# Patient Record
Sex: FEMALE | Race: WHITE
[De-identification: names, ages, dates, MRNs, and addresses within clinical notes are randomized per-mention and may not be internally consistent; named-entity substitution may affect disease eponyms.]

---

## 2019-01-31 ENCOUNTER — HOSPITAL ENCOUNTER (INPATIENT)
Dept: HOSPITAL 74 - YASAS | Age: 39
LOS: 3 days | Discharge: HOME | DRG: 773 | End: 2019-02-03
Attending: NEUROMUSCULOSKELETAL MEDICINE & OMM | Admitting: NEUROMUSCULOSKELETAL MEDICINE & OMM
Payer: COMMERCIAL

## 2019-01-31 VITALS — BODY MASS INDEX: 24.3 KG/M2

## 2019-01-31 DIAGNOSIS — F90.9: ICD-10-CM

## 2019-01-31 DIAGNOSIS — I10: ICD-10-CM

## 2019-01-31 DIAGNOSIS — F10.230: Primary | ICD-10-CM

## 2019-01-31 DIAGNOSIS — F11.20: ICD-10-CM

## 2019-01-31 DIAGNOSIS — G47.00: ICD-10-CM

## 2019-01-31 DIAGNOSIS — F10.220: ICD-10-CM

## 2019-01-31 DIAGNOSIS — K74.60: ICD-10-CM

## 2019-01-31 DIAGNOSIS — Z91.5: ICD-10-CM

## 2019-01-31 DIAGNOSIS — Z91.19: ICD-10-CM

## 2019-01-31 DIAGNOSIS — F17.213: ICD-10-CM

## 2019-01-31 DIAGNOSIS — B18.2: ICD-10-CM

## 2019-01-31 DIAGNOSIS — J45.22: ICD-10-CM

## 2019-01-31 DIAGNOSIS — Z59.0: ICD-10-CM

## 2019-01-31 DIAGNOSIS — Z87.42: ICD-10-CM

## 2019-01-31 DIAGNOSIS — F51.05: ICD-10-CM

## 2019-01-31 DIAGNOSIS — F43.10: ICD-10-CM

## 2019-01-31 DIAGNOSIS — Z88.2: ICD-10-CM

## 2019-01-31 DIAGNOSIS — F19.24: ICD-10-CM

## 2019-01-31 DIAGNOSIS — Z91.013: ICD-10-CM

## 2019-01-31 PROCEDURE — HZ2ZZZZ DETOXIFICATION SERVICES FOR SUBSTANCE ABUSE TREATMENT: ICD-10-PCS | Performed by: SURGERY

## 2019-01-31 RX ADMIN — NICOTINE SCH: 21 PATCH TRANSDERMAL at 14:02

## 2019-01-31 RX ADMIN — Medication SCH MG: at 22:13

## 2019-01-31 SDOH — ECONOMIC STABILITY - HOUSING INSECURITY: HOMELESSNESS: Z59.0

## 2019-01-31 NOTE — EKG
Test Reason : 

Blood Pressure : ***/*** mmHG

Vent. Rate : 070 BPM     Atrial Rate : 070 BPM

   P-R Int : 144 ms          QRS Dur : 090 ms

    QT Int : 436 ms       P-R-T Axes : 010 055 059 degrees

   QTc Int : 470 ms

 

NORMAL SINUS RHYTHM

NORMAL ECG

NO PREVIOUS ECGS AVAILABLE

Confirmed by SKY DE LUNA, YVETTE ([id number]) on 1/31/2019 4:36:46 PM

 

Referred By:             Confirmed By:YVETTE SWANSON MD

## 2019-01-31 NOTE — HP
CIWA Score


Nausea/Vomiting: 3


Muscle Tremors: 3


Anxiety: 3


Agitation: 3


Paroxysmal Sweats: 1-Minimal Palms Moist


Orientation: 0-Oriented


Tacttile Disturbances: 1-Very Mild Itch/Numbness


Auditory Disturbances: 1-Very Mild


Visual Disturbances: 1-Very Mild Sensitivity


Headache: 2-Mild


CIWA-Ar Total Score: 18





- Admission Criteria


[facility] Guidelines: Admission for Medically Managed Detox: 


Requires at least one of the followin. CIWA greater than 12


2. Seizures within the past 24 hours


3. Delirium tremens within the past 24 hours


4. Hallucinations within the past 24 hours


5. Acute intervention needed for co  occurring medical disorder


6. Acute intervention needed for co  occurring psychiatric disorder


7. Severe withdrawal that cannot be handled at a lower level of care (continued


    vomiting, continued diarrhea, abnormal vital signs) requiring intravenous


    medication and/or fluids


8. Pregnancy





Patient presents the following: CIWA greater than 12


Admission Criteria Met: Admission criteria met





Admission ROS S





- HPI


Chief Complaint: 





i need help to stop drinking alcohol


Allergies/Adverse Reactions: 


 Allergies











Allergy/AdvReac Type Severity Reaction Status Date / Time


 


Fish Containing Products Allergy Severe Difficulty Verified 19 11:06





   Breathing  


 


Sulfa (Sulfonamide Allergy Severe Swelling Verified 19 11:06





Antibiotics)     











History of Present Illness: 





this 38 years old female with alcohol dependence,seeking detox,last treatment 

interfaithe 19 to 19 not completed,


mmtp 80 mgs/day,last medicated 19


history of hypertension


hepatitis c since  treated but no treatment since 19 non compliance


asthma on albuterol inhaler


cirrhosis of liver since 2016


longest period of sobriety 1 year


nicotine dependence











- Ebola screening


Have you traveled outside of the country in the last 21 days: No


Have you had contact with anyone from an Ebola affected area: No


Have you been sick,other than usual withdrawal symptoms: No


Do you have a fever: No





- Review of Systems


Constitutional: Loss of Appetite, Malaise, Night Sweats, Changes in sleep, 

Weakness


EENT: reports: Nose Congestion


Respiratory: reports: Other (asthma)


Cardiac: reports: No Symptoms Reported


GI: reports: Nausea, Poor Appetite, Vomiting, Abdominal cramping


: reports: No Symptoms Reported


Musculoskeletal: reports: Back Pain, Muscle Pain


Integumentary: reports: Dryness


Neuro: reports: Headache, Tremors


Endocrine: reports: No Symptoms Reported


Hematology: reports: No Symptoms Reported


Psychiatric: reports: No Sypmtoms Reported, Judgement Intact, Mood/Affect 

Appropiate, Orientated x3, Anxious (adhd,ptsd), Depressed





Patient History





- Patient Medical History


Hx Asthma: Yes (Pt is on MDI.)


Hx Chronic Obstructive Pulmonary Disease (COPD): No


Hx Cardiac Disorders: No


Hx Hypertension: Yes (on meds.)


Hx Seizures: No


Hx Diabetes: No


Hx Gastrointestinal Disorders: No


Hx Genitourinary Disorders: No


Hx Sexually Transmitted Disorders: Yes (Tx for chlamydia, HPV)


Hx Renal Disease (ESRD): No


Hx Thyroid Disease: No


Hx Human Immunodeficiency Virus (HIV): No (last  negative)


Hx Hepatitis C: Yes (jose eduardo compliance wtih treatment)


Hx Depression: Yes


Hx Suicide Attempt: Yes (Tried to cut herself in 2016)


Hx Bipolar Disorder: No


Hx Schizophrenia: No


Other Medical History: anxiety,adhd,ptsd





- Patient Surgical History


Past Surgical History: Yes


Other Surgical History: Tonsilectomy in 





- PPD History


Previous Implant?: Yes


Documented Results: Negative w/o proof


Implanted On Prior R Admission?: No


PPD to be Administered?: Yes





- Reproductive History


Patient is a Female of Child Bearing Age (11 -55 yrs old): Yes


Last Menstrual Period: 19


Patient Pregnant: No





- Smoking Cessation


Smoking history: Current every day smoker


Have you smoked in the past 12 months: Yes


Aproximately how many cigarettes per day: 10


Hx Chewing Tobacco Use: No


Initiated information on smoking cessation: Yes


'Breaking Loose' booklet given: 19





- Substance & Tx. History


Hx Alcohol Use: Yes


Hx Substance Use: No


Substance Use Type: Alcohol


Hx Substance Use Treatment: Yes (interfaithe 19 to 19 not completed)





- Substances Abused


  ** Alcohol


Route: Oral


Frequency: Daily


Amount used: 1 liter vodka


Age of first use: 15


Date of Last Use: 19





Family Disease History





- Family Disease History


Family Disease History: Other: Father (alcohol,), Mother (alcohol)





Admission Physical Exam [facility]





- Vital Signs


Vital Signs: 


 Vital Signs - 24 hr











  19





  10:03


 


Temperature 98.1 F


 


Pulse Rate 90


 


Respiratory 18





Rate 


 


Blood Pressure 127/98














- Physical


General Appearance: Yes: Moderate Distress, Tremorous, Irritable, Sweating, 

Anxious


HEENTM: Yes: Normal ENT Inspection, СВЕТЛАНА, Pharynx Normal, Other (no teeth,no 

denture)


Respiratory: Yes: Lungs Clear, Normal Breath Sounds, No Respiratory Distress, 

Other (asthma)


Neck: Yes: Within Normal Limits, Supple, Trachea in good position


Breast: Yes: Breast Exam Deferred


Cardiology: Yes: Within Normal Limits, Regular Rhythm, Regular Rate, S1, S2


Abdominal: Yes: Within Normal Limits, Normal Bowel Sounds, Non Tender, Flat, 

Soft


Genitourinary: Yes: Within Normal Limits


Back: Yes: Muscle Spasm


Musculoskeletal: Yes: Back pain, Muscle Pain


Extremities: Yes: Tremors


Neurological: Yes: CNs II-XII NML intact, Fully Oriented, Alert, Motor Strength 

5/5


Integumentary: Yes: Dry


Lymphatic: Yes: Within Normal Limits





- Diagnostic


(1) Alcohol dependence with uncomplicated withdrawal


Current Visit: Yes   Status: Acute   





(2) Alcohol dependence with uncomplicated intoxication


Current Visit: Yes   Status: Acute   





(3) Syncope


Current Visit: Yes   Status: Acute   





(4) Hepatitis C


Current Visit: Yes   Status: Acute   





(5) History of cirrhosis of liver


Current Visit: Yes   Status: Acute   





(6) Hypertension


Current Visit: Yes   Status: Acute   





(7) Nicotine dependence


Current Visit: Yes   Status: Acute   





(8) Asthma


Current Visit: Yes   Status: Acute   





(9) Methadone maintenance therapy patient


Current Visit: Yes   Status: Acute   





(10) History of suicidal ideation


Current Visit: Yes   Status: Acute   





(11) Insomnia secondary to depression with anxiety


Current Visit: Yes   Status: Acute   





(12) ADHD


Current Visit: Yes   Status: Acute   





(13) PTSD (post-traumatic stress disorder)


Current Visit: Yes   Status: Acute   





Cleared for Admission [facility]





- Detox or Rehab


[facility] Level of Care: Medically Managed (patient has previous detox with librium,

would like to be on librium regimen,liver enzyme pending,will give liriium 

regimen for now)


Detox Regimen/Protocol: Librium





[facility] Breath Alcohol Content


Breath Alcohol Content: 0.135





Urine Pregancy Test





- Result


Urine Pregnancy Test Results: Negative- NO Line Present





Urine Drug Screen





- Results


Drug Screen Negative: No


Urine Drug Screen Results: BZO-Benzodiazepines, MTD-Methadone

## 2019-02-01 LAB
ALBUMIN SERPL-MCNC: 3.5 G/DL (ref 3.4–5)
ALP SERPL-CCNC: 68 U/L (ref 45–117)
ALT SERPL-CCNC: 26 U/L (ref 13–61)
ANION GAP SERPL CALC-SCNC: 9 MMOL/L (ref 8–16)
APPEARANCE UR: CLEAR
AST SERPL-CCNC: 30 U/L (ref 15–37)
BACTERIA #/AREA URNS HPF: (no result) /HPF
BILIRUB SERPL-MCNC: 1 MG/DL (ref 0.2–1)
BILIRUB UR STRIP.AUTO-MCNC: NEGATIVE MG/DL
BUN SERPL-MCNC: 6 MG/DL (ref 7–18)
CALCIUM SERPL-MCNC: 8.8 MG/DL (ref 8.5–10.1)
CHLORIDE SERPL-SCNC: 104 MMOL/L (ref 98–107)
CO2 SERPL-SCNC: 28 MMOL/L (ref 21–32)
COLOR UR: YELLOW
CREAT SERPL-MCNC: 0.6 MG/DL (ref 0.55–1.3)
DEPRECATED RDW RBC AUTO: 14.6 % (ref 11.6–15.6)
EPITH CASTS URNS QL MICRO: (no result) /HPF
GLUCOSE SERPL-MCNC: 98 MG/DL (ref 74–106)
HCT VFR BLD CALC: 37 % (ref 32.4–45.2)
HGB BLD-MCNC: 12.7 GM/DL (ref 10.7–15.3)
KETONES UR QL STRIP: NEGATIVE
LEUKOCYTE ESTERASE UR QL STRIP.AUTO: (no result)
MCH RBC QN AUTO: 31.2 PG (ref 25.7–33.7)
MCHC RBC AUTO-ENTMCNC: 34.3 G/DL (ref 32–36)
MCV RBC: 90.8 FL (ref 80–96)
NITRITE UR QL STRIP: NEGATIVE
PH UR: 6 [PH] (ref 5–8)
PLATELET # BLD AUTO: 152 K/MM3 (ref 134–434)
PMV BLD: 9.8 FL (ref 7.5–11.1)
POTASSIUM SERPLBLD-SCNC: 4.1 MMOL/L (ref 3.5–5.1)
PROT SERPL-MCNC: 6.5 G/DL (ref 6.4–8.2)
PROT UR QL STRIP: NEGATIVE
PROT UR QL STRIP: NEGATIVE
RBC # BLD AUTO: 4.07 M/MM3 (ref 3.6–5.2)
SODIUM SERPL-SCNC: 140 MMOL/L (ref 136–145)
SP GR UR: 1 (ref 1.01–1.03)
UROBILINOGEN UR STRIP-MCNC: NEGATIVE MG/DL (ref 0.2–1)
WBC # BLD AUTO: 3.8 K/MM3 (ref 4–10)

## 2019-02-01 RX ADMIN — TOLNAFTATE SCH APPLIC: 10 CREAM TOPICAL at 21:47

## 2019-02-01 RX ADMIN — Medication SCH MG: at 21:48

## 2019-02-01 RX ADMIN — IBUPROFEN PRN MG: 400 TABLET, FILM COATED ORAL at 16:41

## 2019-02-01 RX ADMIN — Medication SCH TAB: at 10:13

## 2019-02-01 RX ADMIN — FLUTICASONE PROPIONATE SCH SPRAY: 50 SPRAY, METERED NASAL at 21:45

## 2019-02-01 RX ADMIN — HYDROCORTISONE SCH APPLIC: 0.5 CREAM TOPICAL at 15:38

## 2019-02-01 RX ADMIN — TOLNAFTATE SCH APPLIC: 10 CREAM TOPICAL at 14:29

## 2019-02-01 RX ADMIN — HYDROCORTISONE SCH APPLIC: 0.5 CREAM TOPICAL at 21:46

## 2019-02-01 RX ADMIN — IBUPROFEN PRN MG: 400 TABLET, FILM COATED ORAL at 10:15

## 2019-02-01 RX ADMIN — METHADONE HYDROCHLORIDE SCH MG: 40 TABLET ORAL at 05:41

## 2019-02-01 RX ADMIN — NICOTINE SCH MG: 21 PATCH TRANSDERMAL at 10:17

## 2019-02-01 RX ADMIN — NIFEDIPINE SCH MG: 30 TABLET, EXTENDED RELEASE ORAL at 10:15

## 2019-02-01 RX ADMIN — FLUTICASONE PROPIONATE SCH SPRAY: 50 SPRAY, METERED NASAL at 15:38

## 2019-02-01 NOTE — PN
S CIWA





- CIWA Score


Nausea/Vomiting: 3


Muscle Tremors: 3


Anxiety: 3


Agitation: 2


Paroxysmal Sweats: 3


Orientation: 0-Oriented


Tacttile Disturbances: 2-Mild Itch/Numbness/Burn


Auditory Disturbances: 0-None


Visual Disturbances: 0-None


Headache: 3-Moderate


CIWA-Ar Total Score: 19





BHS Progress Note (SOAP)


Subjective: 





Tremors, Sweating, H/A, Nausea, Stomach Cramping, Chills, Interrupted Sleep, 

Constipation, Body Aches.


Objective: 


PATIENT A & O X 3, OBSERVED AMBULATING ON UNIT. IN NO ACUTE DISTRESS.





02/01/19 18:39


 Vital Signs











Temperature  96.8 F L  02/01/19 17:22


 


Pulse Rate  75   02/01/19 17:22


 


Respiratory Rate  18   02/01/19 17:22


 


Blood Pressure  122/85   02/01/19 17:22


 


O2 Sat by Pulse Oximetry (%)      








 Laboratory Tests











  02/01/19 02/01/19 02/01/19





  00:30 07:00 07:00


 


WBC    3.8 L


 


RBC    4.07


 


Hgb    12.7


 


Hct    37.0


 


MCV    90.8


 


MCH    31.2


 


MCHC    34.3


 


RDW    14.6


 


Plt Count    152


 


MPV    9.8


 


Sodium   


 


Potassium   


 


Chloride   


 


Carbon Dioxide   


 


Anion Gap   


 


BUN   


 


Creatinine   


 


Creat Clearance w eGFR   


 


Random Glucose   


 


Calcium   


 


Total Bilirubin   


 


AST   


 


ALT   


 


Alkaline Phosphatase   


 


Total Protein   


 


Albumin   


 


Urine Color  Yellow  


 


Urine Appearance  Clear  


 


Urine pH  6.0  


 


Ur Specific Gravity  1.004 L  


 


Urine Protein  Negative  


 


Urine Glucose (UA)  Negative  


 


Urine Ketones  Negative  


 


Urine Blood  1+ H  


 


Urine Nitrite  Negative  


 


Urine Bilirubin  Negative  


 


Urine Urobilinogen  Negative  


 


Ur Leukocyte Esterase  Trace  


 


Urine WBC (Auto)  <1  


 


Urine RBC (Auto)  1  


 


Ur Epithelial Cells  Rare  


 


Urine Bacteria  Rare  


 


HIV 1&2 Antibody Screen   Negative 


 


HIV P24 Antigen   Negative 














  02/01/19





  07:00


 


WBC 


 


RBC 


 


Hgb 


 


Hct 


 


MCV 


 


MCH 


 


MCHC 


 


RDW 


 


Plt Count 


 


MPV 


 


Sodium  140


 


Potassium  4.1


 


Chloride  104


 


Carbon Dioxide  28


 


Anion Gap  9


 


BUN  6 L


 


Creatinine  0.6


 


Creat Clearance w eGFR  > 60


 


Random Glucose  98


 


Calcium  8.8


 


Total Bilirubin  1.0


 


AST  30


 


ALT  26


 


Alkaline Phosphatase  68


 


Total Protein  6.5


 


Albumin  3.5


 


Urine Color 


 


Urine Appearance 


 


Urine pH 


 


Ur Specific Gravity 


 


Urine Protein 


 


Urine Glucose (UA) 


 


Urine Ketones 


 


Urine Blood 


 


Urine Nitrite 


 


Urine Bilirubin 


 


Urine Urobilinogen 


 


Ur Leukocyte Esterase 


 


Urine WBC (Auto) 


 


Urine RBC (Auto) 


 


Ur Epithelial Cells 


 


Urine Bacteria 


 


HIV 1&2 Antibody Screen 


 


HIV P24 Antigen 








LABS NOTED.


RPR RESULT PENDING.


02/01/19 18:40





Assessment: 





02/01/19 18:40


WITHDRAWAL SYMPTOMS.


Plan: 





CONTINUE DETOX.


INCREASE DAILY PO FLUID INTAKE.


PRN MOM FOR CONSTIPATION.

## 2019-02-01 NOTE — CONSULT
[facility] Psychiatric Consult





- Data


Date of interview: 02/01/19


Admission source: [facility]


Identifying data: First admission to [facility] for this 37 y/o  female 

with a background of heroin + alcohol dependence, undergoing detoxification at 

[address] [place] [address]. Patient is single, a mother of eight, homeless, unemployed and 

supported on Public Assistance.


Substance Abuse History: Discussed with the patient in this interview. Details 

in current [facility] report as follows : Smoking history: Current every day smoker.  

Have you smoked in the past 12 months: Yes.  Aproximately how many cigarettes 

per day: 10.  Hx Chewing Tobacco Use: No.  Initiated information on smoking 

cessation: Yes.  'Breaking Loose' booklet given: 01/31/19.  - Substance & Tx. 

History.  Hx Alcohol Use: Yes.  Hx Substance Use: No.  Substance Use Type: 

Alcohol.  Hx Substance Use Treatment: Yes (interfaithe 01/27/19 to 01/28/19 not 

completed).  - Substances Abused.  ** Alcohol.  Route: Oral.  Frequency: Daily.

  Amount used: 1 liter vodka.  Age of first use: 15.  Date of Last Use: 01/31/19


Medical History: Consistent with bronchial asthma, hepatitis C, cirrhosis of 

the liver, hypertension, antecedent of myocardial infarction (self-report) in 

March 2018 and a history of treatment for sexually transmitted diseases (HPV + 

chlamydia).


Psychiatric History: Patient endorses a history of multiple psychiatric 

hospitalizations, mostly at facilities located in [place]. Ms Ignacio has also 

indicated a recent hospitalization at the [facility] (discharged in 

June 2018) in [facility]. Diagnosed with Bipolar Disorder.  Has been 

prescribed a regimen consisting of sertraline, trazodone, gabapentin and 

hydroxyzine. Patient has been lost to follow-up since 1/25/19 after she " 

discharged " herself from [facility] (maintained on methadone 80 mg/day) [place] 

[place]. Presents with a history of multiple suicide attempts via various means (

overdose with heroin, medications, self-mutilation). Most recent suicide 

attempt (cutting) has occurred a couple years ago.


Physical/Sexual Abuse/Trauma History: Patient declines to discuss this domain.


Additional Comment: Urine Drug Screen Results: BZO-Benzodiazepines, MTD-

Methadone. Noted.





Mental Status Exam





- Mental Status Exam


Alert and Oriented to: Time, Place, Person


Cognitive Function: Good


Patient Appearance: Well Groomed (tattoo on the dorsum of left hand)


Mood: Withdrawn, Hopeful


Affect: Appropriate, Normal Range


Patient Behavior: Fatigued, Appropriate, Cooperative


Speech Pattern: Clear


Voice Loudness: Normal


Thought Process: Goal Oriented


Thought Disorder: Not Present


Hallucinations: Denies


Suicidal Ideation: Denies


Homicidal Ideation: Denies


Insight/Judgement: Poor


Sleep: Poorly, Difficulty falling asleep


Appetite: Good


Muscle strength/Tone: Normal


Gait/Station: Normal





Psychiatric Findings





- Problem List (Axis 1, 2,3)


(1) Alcohol dependence with uncomplicated withdrawal


Current Visit: Yes   Status: Acute   





(2) Opioid dependence on agonist therapy


Current Visit: Yes   Status: Chronic   





(3) Nicotine dependence


Current Visit: Yes   Status: Chronic   





(4) Insomnia


Current Visit: Yes   Status: Chronic   





(5) Substance induced mood disorder


Current Visit: Yes   Status: Chronic   





(6) History of bipolar disorder


Current Visit: No   Status: Chronic   





(7) Non-compliance


Current Visit: Yes   Status: Chronic   





- Initial Treatment Plan


Initial Treatment Plan: Psychoeducation. Sleep hygiene. Detoxification in 

progress. Support. Rehabilitation is advised. AA/NA meetings. Motivational 

rounds for consolidation of sobriety. Will resume trazodone at the request of 

the patient. Trazodone 50 mg po hs. Ordered. Side effects/benefits are 

discussed with patient. Ms Ignacio is in agreement with this plan of care. 

Observation.

## 2019-02-02 RX ADMIN — ALUMINUM HYDROXIDE, MAGNESIUM HYDROXIDE, AND SIMETHICONE PRN ML: 200; 200; 20 SUSPENSION ORAL at 19:21

## 2019-02-02 RX ADMIN — ALUMINUM HYDROXIDE, MAGNESIUM HYDROXIDE, AND SIMETHICONE PRN ML: 200; 200; 20 SUSPENSION ORAL at 14:11

## 2019-02-02 RX ADMIN — FLUTICASONE PROPIONATE SCH SPRAY: 50 SPRAY, METERED NASAL at 22:20

## 2019-02-02 RX ADMIN — Medication SCH MG: at 22:18

## 2019-02-02 RX ADMIN — IBUPROFEN PRN MG: 400 TABLET, FILM COATED ORAL at 14:53

## 2019-02-02 RX ADMIN — Medication SCH TAB: at 10:20

## 2019-02-02 RX ADMIN — METHADONE HYDROCHLORIDE SCH MG: 40 TABLET ORAL at 05:52

## 2019-02-02 RX ADMIN — HYDROCORTISONE SCH APPLIC: 0.5 CREAM TOPICAL at 10:20

## 2019-02-02 RX ADMIN — NICOTINE SCH MG: 21 PATCH TRANSDERMAL at 10:21

## 2019-02-02 RX ADMIN — TOLNAFTATE SCH APPLIC: 10 CREAM TOPICAL at 22:20

## 2019-02-02 RX ADMIN — HYDROCORTISONE SCH APPLIC: 0.5 CREAM TOPICAL at 22:21

## 2019-02-02 RX ADMIN — NIFEDIPINE SCH MG: 30 TABLET, EXTENDED RELEASE ORAL at 10:20

## 2019-02-02 RX ADMIN — TOLNAFTATE SCH APPLIC: 10 CREAM TOPICAL at 10:21

## 2019-02-02 RX ADMIN — FLUTICASONE PROPIONATE SCH SPRAY: 50 SPRAY, METERED NASAL at 10:21

## 2019-02-02 NOTE — PN
[facility] CIWA





- CIWA Score


Nausea/Vomitin-No Nausea/No Vomiting


Muscle Tremors: None


Anxiety: 4-Mod. Anxious/Guarded


Agitation: 3


Paroxysmal Sweats: 3


Orientation: 0-Oriented


Tacttile Disturbances: 1-Very Mild Itch/Numbness


Auditory Disturbances: 0-None


Visual Disturbances: 0-None


Headache: 0-None Present


CIWA-Ar Total Score: 11





BHS Progress Note (SOAP)


Subjective: 





Constipation, Anxious, Body Aches, Sweating.


Objective: 


PATIENT A & O X 3, OBSERVED AMBULATING ON UNIT. IN NO ACUTE DISTRESS.





19 16:24


 Vital Signs











Temperature  97.0 F L  19 14:02


 


Pulse Rate  78   19 14:02


 


Respiratory Rate  18   19 14:02


 


Blood Pressure  117/82   19 14:02


 


O2 Sat by Pulse Oximetry (%)      








 Laboratory Tests











  19





  00:30 07:00 07:00


 


WBC    3.8 L


 


RBC    4.07


 


Hgb    12.7


 


Hct    37.0


 


MCV    90.8


 


MCH    31.2


 


MCHC    34.3


 


RDW    14.6


 


Plt Count    152


 


MPV    9.8


 


Sodium   


 


Potassium   


 


Chloride   


 


Carbon Dioxide   


 


Anion Gap   


 


BUN   


 


Creatinine   


 


Creat Clearance w eGFR   


 


Random Glucose   


 


Calcium   


 


Total Bilirubin   


 


AST   


 


ALT   


 


Alkaline Phosphatase   


 


Total Protein   


 


Albumin   


 


Urine Color  Yellow  


 


Urine Appearance  Clear  


 


Urine pH  6.0  


 


Ur Specific Gravity  1.004 L  


 


Urine Protein  Negative  


 


Urine Glucose (UA)  Negative  


 


Urine Ketones  Negative  


 


Urine Blood  1+ H  


 


Urine Nitrite  Negative  


 


Urine Bilirubin  Negative  


 


Urine Urobilinogen  Negative  


 


Ur Leukocyte Esterase  Trace  


 


Urine WBC (Auto)  <1  


 


Urine RBC (Auto)  1  


 


Ur Epithelial Cells  Rare  


 


Urine Bacteria  Rare  


 


RPR Titer   


 


HIV 1&2 Antibody Screen   Negative 


 


HIV P24 Antigen   Negative 














  19





  07:00 07:00


 


WBC  


 


RBC  


 


Hgb  


 


Hct  


 


MCV  


 


MCH  


 


MCHC  


 


RDW  


 


Plt Count  


 


MPV  


 


Sodium  140 


 


Potassium  4.1 


 


Chloride  104 


 


Carbon Dioxide  28 


 


Anion Gap  9 


 


BUN  6 L 


 


Creatinine  0.6 


 


Creat Clearance w eGFR  > 60 


 


Random Glucose  98 


 


Calcium  8.8 


 


Total Bilirubin  1.0 


 


AST  30 


 


ALT  26 


 


Alkaline Phosphatase  68 


 


Total Protein  6.5 


 


Albumin  3.5 


 


Urine Color  


 


Urine Appearance  


 


Urine pH  


 


Ur Specific Gravity  


 


Urine Protein  


 


Urine Glucose (UA)  


 


Urine Ketones  


 


Urine Blood  


 


Urine Nitrite  


 


Urine Bilirubin  


 


Urine Urobilinogen  


 


Ur Leukocyte Esterase  


 


Urine WBC (Auto)  


 


Urine RBC (Auto)  


 


Ur Epithelial Cells  


 


Urine Bacteria  


 


RPR Titer   Nonreactive


 


HIV 1&2 Antibody Screen  


 


HIV P24 Antigen  








LABS NOTED.


Assessment: 





19 16:24


WITHDRAWAL SYMPTOMS.


Plan: 





CONTINUE DETOX.


INCREASE DAILY PO FLUID INTAKE.


PRN MOM FOR CONSTIPATION.

## 2019-02-03 VITALS — TEMPERATURE: 98 F | SYSTOLIC BLOOD PRESSURE: 104 MMHG | HEART RATE: 56 BPM | DIASTOLIC BLOOD PRESSURE: 62 MMHG

## 2019-02-03 RX ADMIN — METHADONE HYDROCHLORIDE SCH MG: 40 TABLET ORAL at 06:48

## 2019-02-03 RX ADMIN — Medication SCH: at 10:30

## 2019-02-03 RX ADMIN — HYDROCORTISONE SCH: 0.5 CREAM TOPICAL at 10:30

## 2019-02-03 RX ADMIN — NICOTINE SCH: 21 PATCH TRANSDERMAL at 10:30

## 2019-02-03 RX ADMIN — FLUTICASONE PROPIONATE SCH: 50 SPRAY, METERED NASAL at 10:30

## 2019-02-03 RX ADMIN — NIFEDIPINE SCH: 30 TABLET, EXTENDED RELEASE ORAL at 10:30

## 2019-02-03 RX ADMIN — TOLNAFTATE SCH: 10 CREAM TOPICAL at 10:30

## 2019-02-03 NOTE — DS
[facility] Detox Discharge Summary


Admission Date: 


01/31/19





Discharge Date: 02/03/19





- History


Present History: Alcohol Dependence


Additional Comments: 





38 years old female admitted on 01/31/19 for alcohol withdrawal stabilization


feeling better today able to manage mild alcohol withdrawal sx with support of 

mother and  none pharmapeutical management


alert no acute distress aftercare [facility]


Pertinent Past History: 





patient is in the process of hepatitic c treatment


discuss the important of adherence and the benefits of treatment completion





- Physical Exam Results


Vital Signs: 


 Vital Signs











Temperature  98 F   02/03/19 06:27


 


Pulse Rate  56 L  02/03/19 06:27


 


Respiratory Rate  18   02/03/19 06:30


 


Blood Pressure  104/62   02/03/19 06:27


 


O2 Sat by Pulse Oximetry (%)      











Pertinent Admission Physical Exam Findings: 





alcohol withdrawal sx


 Laboratory Last Values











WBC  3.8 K/mm3 (4.0-10.0)  L  02/01/19  07:00    


 


RBC  4.07 M/mm3 (3.60-5.2)   02/01/19  07:00    


 


Hgb  12.7 GM/dL (10.7-15.3)   02/01/19  07:00    


 


Hct  37.0 % (32.4-45.2)   02/01/19  07:00    


 


MCV  90.8 fl (80-96)   02/01/19  07:00    


 


MCH  31.2 pg (25.7-33.7)   02/01/19  07:00    


 


MCHC  34.3 g/dl (32.0-36.0)   02/01/19  07:00    


 


RDW  14.6 % (11.6-15.6)   02/01/19  07:00    


 


Plt Count  152 K/MM3 (134-434)   02/01/19  07:00    


 


MPV  9.8 fl (7.5-11.1)   02/01/19  07:00    


 


Sodium  140 mmol/L (136-145)   02/01/19  07:00    


 


Potassium  4.1 mmol/L (3.5-5.1)   02/01/19  07:00    


 


Chloride  104 mmol/L ()   02/01/19  07:00    


 


Carbon Dioxide  28 mmol/L (21-32)   02/01/19  07:00    


 


Anion Gap  9 MMOL/L (8-16)   02/01/19  07:00    


 


BUN  6 mg/dL (7-18)  L  02/01/19  07:00    


 


Creatinine  0.6 mg/dL (0.55-1.3)   02/01/19  07:00    


 


Creat Clearance w eGFR  > 60  (>60)   02/01/19  07:00    


 


Random Glucose  98 mg/dL ()   02/01/19  07:00    


 


Calcium  8.8 mg/dL (8.5-10.1)   02/01/19  07:00    


 


Total Bilirubin  1.0 mg/dL (0.2-1)   02/01/19  07:00    


 


AST  30 U/L (15-37)   02/01/19  07:00    


 


ALT  26 U/L (13-61)   02/01/19  07:00    


 


Alkaline Phosphatase  68 U/L ()   02/01/19  07:00    


 


Total Protein  6.5 g/dl (6.4-8.2)   02/01/19  07:00    


 


Albumin  3.5 g/dl (3.4-5.0)   02/01/19  07:00    


 


Urine Color  Yellow   02/01/19  00:30    


 


Urine Appearance  Clear   02/01/19  00:30    


 


Urine pH  6.0  (5.0-8.0)   02/01/19  00:30    


 


Ur Specific Gravity  1.004  (1.010-1.035)  L  02/01/19  00:30    


 


Urine Protein  Negative  (NEGATIVE)   02/01/19  00:30    


 


Urine Glucose (UA)  Negative  (NEGATIVE)   02/01/19  00:30    


 


Urine Ketones  Negative  (NEGATIVE)   02/01/19  00:30    


 


Urine Blood  1+  (NEGATIVE)  H  02/01/19  00:30    


 


Urine Nitrite  Negative  (NEGATIVE)   02/01/19  00:30    


 


Urine Bilirubin  Negative  (<2.0 mg/dL)   02/01/19  00:30    


 


Urine Urobilinogen  Negative mg/dL (0.2-1.0)   02/01/19  00:30    


 


Ur Leukocyte Esterase  Trace  (NEGATIVE)   02/01/19  00:30    


 


Urine WBC (Auto)  <1 /hpf (3-5)   02/01/19  00:30    


 


Urine RBC (Auto)  1 /hpf (0-3)   02/01/19  00:30    


 


Ur Epithelial Cells  Rare /HPF (FEW)   02/01/19  00:30    


 


Urine Bacteria  Rare /hpf (NONE SEEN)   02/01/19  00:30    


 


RPR Titer  Nonreactive  (NONREACTIVE)   02/01/19  07:00    


 


HIV 1&2 Antibody Screen  Negative   02/01/19  07:00    


 


HIV P24 Antigen  Negative   02/01/19  07:00    








lab noted


discuss negative consequences of alcohol misuse related to biophysiological 

insult





- Treatment


Hospital Course: Detox Protocol Followed, Detoxed Safely, Responded well, 

Discharged Condition Good, Rehab Referral Accepted


Patient has Accepted a Rehab Referral to: [facility]





- Medication


Discharge Medications: 


Ambulatory Orders





Gabapentin [Neurontin -] 300 mg PO Q8H 01/31/19 


Ribavirin [Ribasphere Ribapak] 2 each PO DAILY 01/31/19 


Ribavirin [Ribasphere Ribapak] 3 each PO HS 01/31/19 


Sertraline HCl [Zoloft -] 50 mg PO DAILY 01/31/19 


Sofosbuvir/Velpatasvir [Epclusa 400 mg-100 mg Tablet] 1 each PO DAILY 01/31/19 


traZODone HCL [Trazodone HCl] 50 mg PO HS 01/31/19 


Albuterol Sulfate Inhaler - [Ventolin HFA Inhaler -] 2 puff IH Q4H PRN #1 

inhaler 02/03/19 


Nifedipine ER [Procardia XL -] 30 mg PO DAILY #14 tab.er.[id number] 02/03/19 











- Diagnosis


(1) Methadone maintenance therapy patient


Current Visit: Yes   Status: Chronic   





(2) Alcohol dependence with uncomplicated intoxication


Current Visit: Yes   Status: Acute   





(3) Asthma


Current Visit: Yes   Status: Chronic   


Qualifiers: 


   Asthma severity: mild   Asthma persistence: intermittent   Asthma 

complication type: with status asthmaticus   Qualified Code(s): J45.22 - Mild 

intermittent asthma with status asthmaticus   





(4) Hepatitis C


Current Visit: Yes   Status: Chronic   


Qualifiers: 


   Viral hepatitis chronicity: unspecified   Hepatic coma status: without 

hepatic coma   Qualified Code(s): B19.20 - Unspecified viral hepatitis C 

without hepatic coma   





(5) Hypertension


Current Visit: Yes   Status: Chronic   


Qualifiers: 


   Hypertension type: essential hypertension   Qualified Code(s): I10 - 

Essential (primary) hypertension   





(6) Nicotine dependence


Current Visit: Yes   Status: Acute   


Qualifiers: 


   Nicotine product type: cigarettes   Substance use status: in withdrawal   

Qualified Code(s): F17.213 - Nicotine dependence, cigarettes, with withdrawal   





(7) Substance induced mood disorder


Current Visit: Yes   Status: Suspected   





- AMA


Did Patient Leave Against Medical Advice: No

## 2019-05-10 ENCOUNTER — EMERGENCY (EMERGENCY)
Facility: HOSPITAL | Age: 39
LOS: 1 days | Discharge: HOME | End: 2019-05-10
Attending: EMERGENCY MEDICINE | Admitting: EMERGENCY MEDICINE
Payer: MEDICAID

## 2019-05-10 VITALS
OXYGEN SATURATION: 100 % | HEART RATE: 103 BPM | SYSTOLIC BLOOD PRESSURE: 156 MMHG | DIASTOLIC BLOOD PRESSURE: 109 MMHG | RESPIRATION RATE: 18 BRPM | TEMPERATURE: 98 F

## 2019-05-10 DIAGNOSIS — R41.82 ALTERED MENTAL STATUS, UNSPECIFIED: ICD-10-CM
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
## 2019-05-10 DIAGNOSIS — S09.90XA UNSPECIFIED INJURY OF HEAD, INITIAL ENCOUNTER: ICD-10-CM
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
## 2019-05-10 DIAGNOSIS — O9A.219 INJURY, POISONING AND CERTAIN OTHER CONSEQUENCES OF EXTERNAL CAUSES COMPLICATING PREGNANCY, UNSPECIFIED TRIMESTER: ICD-10-CM

## 2019-05-10 DIAGNOSIS — F10.129 ALCOHOL ABUSE WITH INTOXICATION, UNSPECIFIED: ICD-10-CM

## 2019-05-10 DIAGNOSIS — Z87.891 PERSONAL HISTORY OF NICOTINE DEPENDENCE: ICD-10-CM

## 2019-05-10 DIAGNOSIS — Z88.2 ALLERGY STATUS TO SULFONAMIDES: ICD-10-CM

## 2019-05-10 DIAGNOSIS — Z91.013 ALLERGY TO SEAFOOD: ICD-10-CM

## 2019-05-10 DIAGNOSIS — S00.212A ABRASION OF LEFT EYELID AND PERIOCULAR AREA, INITIAL ENCOUNTER: ICD-10-CM

## 2019-05-10 DIAGNOSIS — S02.2XXA FRACTURE OF NASAL BONES, INITIAL ENCOUNTER FOR CLOSED FRACTURE: ICD-10-CM

## 2019-05-10 DIAGNOSIS — T74.11XA ADULT PHYSICAL ABUSE, CONFIRMED, INITIAL ENCOUNTER: ICD-10-CM
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
## 2019-05-10 PROCEDURE — 99284 EMERGENCY DEPT VISIT MOD MDM: CPT

## 2019-05-10 PROCEDURE — 70450 CT HEAD/BRAIN W/O DYE: CPT | Mod: 26

## 2019-05-10 PROCEDURE — 70486 CT MAXILLOFACIAL W/O DYE: CPT | Mod: 26

## 2019-05-10 NOTE — ED PROVIDER NOTE - PROGRESS NOTE DETAILS
intox improving- observe until sober received from Dr. Arguello.  CT scan final read without acute fractures.  Safe discharge plan discussed with the patient.
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
## 2019-05-10 NOTE — ED PROVIDER NOTE - EYES, MLM
Clear bilaterally, pupils equal, round and reactive to light. EOMI no entrapment + swelling superficial to L eye, + L orbital swelling Clear bilaterally, pupils equal, round and reactive to light. EOMI no entrapment + swelling superficial to L eye, + L orbital swelling-no zygomatic arch ttp
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
## 2019-05-10 NOTE — ED ADULT NURSE NOTE - NSIMPLEMENTINTERV_GEN_ALL_ED
Implemented All Universal Safety Interventions:  [place] to call system. Call bell, personal items and telephone within reach. Instruct patient to call for assistance. Room bathroom lighting operational. Non-slip footwear when patient is off stretcher. Physically safe environment: no spills, clutter or unnecessary equipment. Stretcher in lowest position, wheels locked, appropriate side rails in place.
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
## 2019-05-11 VITALS
TEMPERATURE: 98 F | DIASTOLIC BLOOD PRESSURE: 83 MMHG | HEART RATE: 78 BPM | OXYGEN SATURATION: 96 % | SYSTOLIC BLOOD PRESSURE: 126 MMHG | RESPIRATION RATE: 18 BRPM

## 2019-05-11 NOTE — ED ADULT NURSE REASSESSMENT NOTE - NS ED NURSE REASSESS COMMENT FT1
patient offered to stay at [facility] till morning, stated she had a safe place to go to and did not want to stay overnight.

## 2019-07-08 NOTE — ED PROVIDER NOTE - NORMAL, MLM
Assessment/Plan:    Type 2 diabetes mellitus without complication, without long-term current use of insulin (HCC)  No results found for: HGBA1C    No results for input(s): POCGLU in the last 72 hours  Blood Sugar Average: Last 72 hrs:    Has type 2 diabetes mellitus  She is currently diet controlled  Her hemoglobin A1c has been going up over the past several years  Her last hemoglobin A1c was 6 9%  Patient did not want medication  I contacted [facility]  They are to fax over her blood test results  By the time the patient left, I still do not have these results  If her hemoglobin A1c is 7 0 or above, I think we should initiate therapy with medication  Patient has been instructed to continue to watch her diet and try to walk as much as possible and stay active  Hashimoto's thyroiditis  Patient has hypothyroidism  Patient will continue levothyroxine 75 mcg daily  Function testing is also pending  Mixed hyperlipidemia  Patient has hyperlipidemia  A direct LDL cholesterol has been ordered  She will continue pravastatin 80 mg daily  Her goal LDL cholesterol is less than 70  Essential hypertension, benign  Patient has hypertension  Systolic blood pressure is a little higher than I would like to see  We discussed this  For now, I did not make any changes  She will continue Benicar-Dyazide 20/12 5 daily  Diagnoses and all orders for this visit:    Hashimoto's thyroiditis  -     levothyroxine 75 mcg tablet; Take 1 tablet (75 mcg total) by mouth daily in the early morning    Type 2 diabetes mellitus without complication, without long-term current use of insulin (HCC)  -     Basic metabolic panel; Future  -     HEMOGLOBIN A1C W/ EAG ESTIMATION; Future    Mixed hyperlipidemia    Essential hypertension, benign          Subjective:      Patient ID: Chemo Chowdary is a 81 y o  female  This patient is a pleasant 64-year-old white female who presents to the office today for her room  Patient tells me she has gained weight and she is very upset about that  She may eating more and also reports she is the  She tells me she never had to about her weight before  She was concerned about her sugar  She did have blood work done about a week ago at her request   These results are currently still pending  The following portions of the patient's history were reviewed and updated as appropriate: allergies, current medications, past family history, past medical history, past social history, past surgical history and problem list     Review of Systems   HENT: Positive for hearing loss, postnasal drip and rhinorrhea  Negative for ear pain, sinus pressure, sinus pain, sneezing and sore throat  Eyes:        Reports diminished vision in both eyes   Respiratory: Negative for cough, chest tightness, shortness of breath and wheezing  Denies sputum production   Cardiovascular: Negative for chest pain, palpitations and leg swelling  Gastrointestinal: Negative for abdominal distention, abdominal pain, blood in stool, constipation, diarrhea, nausea and vomiting  Objective:      /68   Ht 5' 3" (1 6 m)   Wt 65 3 kg (144 lb)   BMI 25 51 kg/m²          Physical Exam   Constitutional:   This patient is a pleasant 78-year-old white female who appears her stated age  She is pleasant and cooperative  She is in no distress  HENT:   Head: Normocephalic and atraumatic  Right Ear: External ear normal    Left Ear: External ear normal    Mouth/Throat: Oropharynx is clear and moist    Tympanic membranes are both clear  Eyes: Pupils are equal, round, and reactive to light  Right eye exhibits discharge  Left eye exhibits discharge  Conjunctiva is injected bilaterally  Neck: Neck supple  No tracheal deviation present  No thyromegaly present  Cardiovascular: Normal rate, regular rhythm and normal heart sounds  Exam reveals no gallop and no friction rub  Pulses are no weak pulses     No murmur heard  Pulses:       Dorsalis pedis pulses are 2+ on the right side, and 2+ on the left side  Posterior tibial pulses are 2+ on the right side, and 2+ on the left side  Pulmonary/Chest: Effort normal and breath sounds normal  No stridor  No respiratory distress  She has no wheezes  She has no rales  Abdominal: Soft  Bowel sounds are normal  She exhibits no distension and no mass  There is no tenderness  There is no rebound and no guarding  Feet:   Right Foot:   Skin Integrity: Negative for ulcer, skin breakdown, erythema, warmth, callus or dry skin  Left Foot:   Skin Integrity: Negative for ulcer, skin breakdown, erythema, warmth, callus or dry skin  Lymphadenopathy:     She has no cervical adenopathy  Vitals reviewed  Extremities:  Without cyanosis, clubbing, or edema  Patient's shoes and socks removed  Right Foot/Ankle   Right Foot Inspection  Skin Exam: skin normal and skin intact no dry skin, no warmth, no callus, no erythema, no maceration, no abnormal color, no pre-ulcer, no ulcer and no callus                          Toe Exam: ROM and strength within normal limits no right toe deformity  Sensory     Proprioception: intact   Monofilament testing: diminished  Vascular  Capillary refills: < 3 seconds  The right DP pulse is 2+  The right PT pulse is 2+  Left Foot/Ankle  Left Foot Inspection  Skin Exam: skin normal and skin intactno dry skin, no warmth, no erythema, no maceration, normal color, no pre-ulcer, no ulcer and no callus                         Toe Exam: ROM and strength within normal limitsno left toe deformity                   Sensory     Proprioception: intact  Monofilament: diminished  Vascular  Capillary refills: < 3 seconds  The left DP pulse is 2+  The left PT pulse is 2+  Assign Risk Category:  No deformity present; Loss of protective sensation;  No weak pulses       Risk: 0 eleonora all pertinent systems normal
